# Patient Record
Sex: FEMALE | Race: OTHER | NOT HISPANIC OR LATINO | URBAN - METROPOLITAN AREA
[De-identification: names, ages, dates, MRNs, and addresses within clinical notes are randomized per-mention and may not be internally consistent; named-entity substitution may affect disease eponyms.]

---

## 2025-02-21 ENCOUNTER — APPOINTMENT (OUTPATIENT)
Dept: RHEUMATOLOGY | Facility: CLINIC | Age: 30
End: 2025-02-21
Payer: COMMERCIAL

## 2025-02-21 ENCOUNTER — APPOINTMENT (OUTPATIENT)
Dept: RADIOLOGY | Facility: CLINIC | Age: 30
End: 2025-02-21
Payer: COMMERCIAL

## 2025-02-21 VITALS
TEMPERATURE: 98.3 F | WEIGHT: 137 LBS | SYSTOLIC BLOOD PRESSURE: 143 MMHG | DIASTOLIC BLOOD PRESSURE: 91 MMHG | OXYGEN SATURATION: 97 % | HEART RATE: 95 BPM

## 2025-02-21 DIAGNOSIS — L40.50 ARTHROPATHIC PSORIASIS, UNSPECIFIED (MULTI): Primary | ICD-10-CM

## 2025-02-21 PROCEDURE — 99204 OFFICE O/P NEW MOD 45 MIN: CPT | Performed by: INTERNAL MEDICINE

## 2025-02-21 RX ORDER — CLOBETASOL PROPIONATE 0.5 MG/ML
50 SOLUTION TOPICAL 2 TIMES DAILY
COMMUNITY
Start: 2024-12-02

## 2025-02-21 RX ORDER — NORGESTIMATE AND ETHINYL ESTRADIOL 7DAYSX3 28
1 KIT ORAL DAILY
COMMUNITY

## 2025-02-21 RX ORDER — SERTRALINE HYDROCHLORIDE 100 MG/1
100 TABLET, FILM COATED ORAL DAILY
COMMUNITY

## 2025-02-21 RX ORDER — VIT C/E/ZN/COPPR/LUTEIN/ZEAXAN 250MG-90MG
5000 CAPSULE ORAL
COMMUNITY
Start: 2024-07-30

## 2025-02-21 ASSESSMENT — RHEUMATOLOGY NEW PATIENT QUESTIONNAIRE
JOINT PAIN: Y
JOINT SWELLING: Y

## 2025-02-21 ASSESSMENT — PAIN SCALES - GENERAL: PAINLEVEL_OUTOF10: 0-NO PAIN

## 2025-03-26 DIAGNOSIS — L40.50 ARTHROPATHIC PSORIASIS, UNSPECIFIED (MULTI): Primary | ICD-10-CM

## 2025-03-26 RX ORDER — SULFASALAZINE 500 MG/1
TABLET, DELAYED RELEASE ORAL
Qty: 522 TABLET | Refills: 0 | Status: SHIPPED | OUTPATIENT
Start: 2025-03-26 | End: 2025-08-14

## 2025-05-23 ENCOUNTER — APPOINTMENT (OUTPATIENT)
Dept: RHEUMATOLOGY | Facility: CLINIC | Age: 30
End: 2025-05-23
Payer: COMMERCIAL

## 2025-07-17 DIAGNOSIS — L40.50 ARTHROPATHIC PSORIASIS, UNSPECIFIED (MULTI): ICD-10-CM

## 2025-07-17 RX ORDER — SULFASALAZINE 500 MG/1
1000 TABLET, DELAYED RELEASE ORAL 2 TIMES DAILY
Qty: 360 TABLET | Refills: 0 | Status: SHIPPED | OUTPATIENT
Start: 2025-07-17

## 2025-08-01 ENCOUNTER — APPOINTMENT (OUTPATIENT)
Dept: RHEUMATOLOGY | Facility: CLINIC | Age: 30
End: 2025-08-01
Payer: COMMERCIAL

## 2025-08-01 VITALS
BODY MASS INDEX: 24.77 KG/M2 | WEIGHT: 139.8 LBS | OXYGEN SATURATION: 98 % | HEIGHT: 63 IN | TEMPERATURE: 98.4 F | HEART RATE: 88 BPM | DIASTOLIC BLOOD PRESSURE: 103 MMHG | SYSTOLIC BLOOD PRESSURE: 140 MMHG

## 2025-08-01 DIAGNOSIS — L40.50 ARTHROPATHIC PSORIASIS, UNSPECIFIED (MULTI): Primary | ICD-10-CM

## 2025-08-01 PROCEDURE — 3008F BODY MASS INDEX DOCD: CPT | Performed by: INTERNAL MEDICINE

## 2025-08-01 PROCEDURE — 99214 OFFICE O/P EST MOD 30 MIN: CPT | Performed by: INTERNAL MEDICINE

## 2025-08-01 ASSESSMENT — PAIN SCALES - GENERAL: PAINLEVEL_OUTOF10: 0-NO PAIN

## 2025-08-01 NOTE — PROGRESS NOTES
Subjective   Patient ID: Rani Valenzuela is a 30 y.o. female who presents for Follow-up.    HPI  29 yo female with PsA  Psoriasis, arthritis  She reports hand joint pain and foot pain  Joint pain started a few years ago  She used some medications  She has also psoriasis  She used clobetasol for skin  She used SAZ which helped her pain, then she stopped it  Not using any meds  Then, she tried another medication  She reports swollen joints, and AM stiffness lasts around one hour  She has psoriasis in elbows, scalps  Denies uveitis, diarrhea  Does not have heel pain or LBP    08/25:  We started SAZ for PsA, but did not work until now.  Planned Otezla first, but her insurance rejected it  She reports joint pain in her hands and toes.    Current med:  SAZ 2 g daily since 04/25    ROS  Joint pain in hands: pos  Joint swelling: pos  Morning stiffness and duration: one hour   strength: normal  Oral ulcer: negative  Genital ulcer: negative  Raynaud phenomenon: negative  Chest pain/dyspnea: negative  Low back pain: negative  Visual problem: negative  Dry eyes/dry mouth: negative  Skin rash/scaling/psoriasis: pos      Objective     PEXAM  VS reviewed, WNL  General: Alert, no distress   HEENT: Normocephalic/atraumatic, No alopecia. PERRLA. Sclera white, conjunctiva pink, no malar rash. no oral or nasal ulcer. Oral cavity pink and moist, no erythema or exudate, dentition good.   Neck: supple  Respiratory: CTA B, no adventitious breath sounds  Cardiac: RRR, no murmurs, carotid, or bruits  Abdominal: symmetrical, soft, non-tender, non-distended, normoactive BSx4 quadrants, no CVA tenderness or suprapubic tenderness  MSK: Joints of upper and lower extremities were assessed for synovitis and ROM.    B/L DIP synovitis and left 2 toe, right first MTP dactylitis  +nail changes, onycholysis  Extremities: no clubbing, no cyanosis, no edema  Skin: Skin warm and moist.   Neuro: non-focal, Strength 5/5 throughout. Normal gait. No cerebellar  pathologic exam     Assessment/Plan    31 yo female with PsA  Her symptoms started a few years ago  She used SAZ and her symptoms improved, but stopped it because of remission  In her last visit, we had planned to start Otezla, but her insurance rejected it. Then, we restarted SAZ, but did not work until now. She is still having hand joint and toe pain.  PExam showed B/L DIP synovitis and left 2 toe, right first MTP dactylitis  +nail changes, onycholysis  She has PsA involving DIP joints and dactylitis.  Her blood tests showed negative acute phases.  Discussed with her and planned to start a biologic treatment without any copayment  -will see her tests  -will see her in 3-4 months

## 2025-08-03 LAB
ALBUMIN SERPL-MCNC: 4.7 G/DL (ref 3.6–5.1)
ALP SERPL-CCNC: 77 U/L (ref 31–125)
ALT SERPL-CCNC: 17 U/L (ref 6–29)
ANION GAP SERPL CALCULATED.4IONS-SCNC: 16 MMOL/L (CALC) (ref 7–17)
AST SERPL-CCNC: 18 U/L (ref 10–30)
BASOPHILS # BLD AUTO: 52 CELLS/UL (ref 0–200)
BASOPHILS NFR BLD AUTO: 0.6 %
BILIRUB SERPL-MCNC: 0.4 MG/DL (ref 0.2–1.2)
BUN SERPL-MCNC: 12 MG/DL (ref 7–25)
CALCIUM SERPL-MCNC: 9.7 MG/DL (ref 8.6–10.2)
CHLORIDE SERPL-SCNC: 103 MMOL/L (ref 98–110)
CO2 SERPL-SCNC: 21 MMOL/L (ref 20–32)
CREAT SERPL-MCNC: 0.56 MG/DL (ref 0.5–0.97)
CRP SERPL-MCNC: NORMAL MG/L
EGFRCR SERPLBLD CKD-EPI 2021: 126 ML/MIN/1.73M2
EOSINOPHIL # BLD AUTO: 258 CELLS/UL (ref 15–500)
EOSINOPHIL NFR BLD AUTO: 3 %
ERYTHROCYTE [DISTWIDTH] IN BLOOD BY AUTOMATED COUNT: 12.2 % (ref 11–15)
ERYTHROCYTE [SEDIMENTATION RATE] IN BLOOD BY WESTERGREN METHOD: 2 MM/H
GLUCOSE SERPL-MCNC: 98 MG/DL (ref 65–99)
HBV SURFACE AG SERPL QL IA: NORMAL
HCT VFR BLD AUTO: 45 % (ref 35–45)
HCV AB SERPL QL IA: NORMAL
HGB BLD-MCNC: 14.6 G/DL (ref 11.7–15.5)
IGNF NEG CNTRL BLD: NORMAL
LYMPHOCYTES # BLD AUTO: 1978 CELLS/UL (ref 850–3900)
LYMPHOCYTES NFR BLD AUTO: 23 %
M TB IFN-G BLD-IMP: NEGATIVE
MCH RBC QN AUTO: 29.8 PG (ref 27–33)
MCHC RBC AUTO-ENTMCNC: 32.4 G/DL (ref 32–36)
MCV RBC AUTO: 91.8 FL (ref 80–100)
MITOGEN IGNF.SPOT COUNT BLD: NORMAL
MONOCYTES # BLD AUTO: 421 CELLS/UL (ref 200–950)
MONOCYTES NFR BLD AUTO: 4.9 %
NEUTROPHILS # BLD AUTO: 5891 CELLS/UL (ref 1500–7800)
NEUTROPHILS NFR BLD AUTO: 68.5 %
PLATELET # BLD AUTO: 345 THOUSAND/UL (ref 140–400)
PMV BLD REES-ECKER: 10.2 FL (ref 7.5–12.5)
POTASSIUM SERPL-SCNC: 4.3 MMOL/L (ref 3.5–5.3)
PROT SERPL-MCNC: 7.3 G/DL (ref 6.1–8.1)
QUEST PANEL A SPOT COUNT: 0
QUEST PANEL B SPOT COUNT: 0
RBC # BLD AUTO: 4.9 MILLION/UL (ref 3.8–5.1)
SODIUM SERPL-SCNC: 140 MMOL/L (ref 135–146)
WBC # BLD AUTO: 8.6 THOUSAND/UL (ref 3.8–10.8)

## 2025-08-04 LAB
ALBUMIN SERPL-MCNC: 4.7 G/DL (ref 3.6–5.1)
ALP SERPL-CCNC: 77 U/L (ref 31–125)
ALT SERPL-CCNC: 17 U/L (ref 6–29)
ANION GAP SERPL CALCULATED.4IONS-SCNC: 16 MMOL/L (CALC) (ref 7–17)
AST SERPL-CCNC: 18 U/L (ref 10–30)
BASOPHILS # BLD AUTO: 52 CELLS/UL (ref 0–200)
BASOPHILS NFR BLD AUTO: 0.6 %
BILIRUB SERPL-MCNC: 0.4 MG/DL (ref 0.2–1.2)
BUN SERPL-MCNC: 12 MG/DL (ref 7–25)
CALCIUM SERPL-MCNC: 9.7 MG/DL (ref 8.6–10.2)
CHLORIDE SERPL-SCNC: 103 MMOL/L (ref 98–110)
CO2 SERPL-SCNC: 21 MMOL/L (ref 20–32)
CREAT SERPL-MCNC: 0.56 MG/DL (ref 0.5–0.97)
CRP SERPL-MCNC: 5 MG/L
EGFRCR SERPLBLD CKD-EPI 2021: 126 ML/MIN/1.73M2
EOSINOPHIL # BLD AUTO: 258 CELLS/UL (ref 15–500)
EOSINOPHIL NFR BLD AUTO: 3 %
ERYTHROCYTE [DISTWIDTH] IN BLOOD BY AUTOMATED COUNT: 12.2 % (ref 11–15)
ERYTHROCYTE [SEDIMENTATION RATE] IN BLOOD BY WESTERGREN METHOD: 2 MM/H
GLUCOSE SERPL-MCNC: 98 MG/DL (ref 65–99)
HBV SURFACE AG SERPL QL IA: NORMAL
HCT VFR BLD AUTO: 45 % (ref 35–45)
HCV AB SERPL QL IA: NORMAL
HGB BLD-MCNC: 14.6 G/DL (ref 11.7–15.5)
IGNF NEG CNTRL BLD: NORMAL
LYMPHOCYTES # BLD AUTO: 1978 CELLS/UL (ref 850–3900)
LYMPHOCYTES NFR BLD AUTO: 23 %
M TB IFN-G BLD-IMP: NEGATIVE
MCH RBC QN AUTO: 29.8 PG (ref 27–33)
MCHC RBC AUTO-ENTMCNC: 32.4 G/DL (ref 32–36)
MCV RBC AUTO: 91.8 FL (ref 80–100)
MITOGEN IGNF.SPOT COUNT BLD: NORMAL
MONOCYTES # BLD AUTO: 421 CELLS/UL (ref 200–950)
MONOCYTES NFR BLD AUTO: 4.9 %
NEUTROPHILS # BLD AUTO: 5891 CELLS/UL (ref 1500–7800)
NEUTROPHILS NFR BLD AUTO: 68.5 %
PLATELET # BLD AUTO: 345 THOUSAND/UL (ref 140–400)
PMV BLD REES-ECKER: 10.2 FL (ref 7.5–12.5)
POTASSIUM SERPL-SCNC: 4.3 MMOL/L (ref 3.5–5.3)
PROT SERPL-MCNC: 7.3 G/DL (ref 6.1–8.1)
QUEST PANEL A SPOT COUNT: 0
QUEST PANEL B SPOT COUNT: 0
RBC # BLD AUTO: 4.9 MILLION/UL (ref 3.8–5.1)
SODIUM SERPL-SCNC: 140 MMOL/L (ref 135–146)
WBC # BLD AUTO: 8.6 THOUSAND/UL (ref 3.8–10.8)

## 2025-08-06 ENCOUNTER — SPECIALTY PHARMACY (OUTPATIENT)
Dept: PHARMACY | Facility: CLINIC | Age: 30
End: 2025-08-06

## 2025-08-06 DIAGNOSIS — L40.50 PSORIATIC ARTHROPATHY (MULTI): Primary | ICD-10-CM

## 2025-08-06 RX ORDER — ADALIMUMAB-ADBM 40MG/0.4ML
40 KIT SUBCUTANEOUS
Qty: 2 EACH | Refills: 5 | Status: SHIPPED | OUTPATIENT
Start: 2025-08-06 | End: 2025-08-07 | Stop reason: ALTCHOICE

## 2025-08-06 NOTE — PROGRESS NOTES
Per Dr. Quan: Patient with psoriatic arthritis, using SAZ which does not work.   We had planned Otezla first, but her insurance rejected it.   Plan to start a biologic treatment without copayment, maybe any TNFi, IL-12/23, IL-17i or IL-23i depends on her insurance.     Insurance prefers Amjevita biosimilar  Amjevita CF Syringes     This medication has been approved through 02/06/2026 and the approval letter has been scanned into the patients chart for your reference. However, the patient has to fill with [facility]; please reroute.

## 2025-08-07 RX ORDER — ADALIMUMAB-ATTO 40 MG/.4ML
40 INJECTION SUBCUTANEOUS
Qty: 0.8 ML | Refills: 5 | Status: SHIPPED | OUTPATIENT
Start: 2025-08-07

## 2025-11-20 ENCOUNTER — APPOINTMENT (OUTPATIENT)
Dept: RHEUMATOLOGY | Facility: CLINIC | Age: 30
End: 2025-11-20